# Patient Record
Sex: FEMALE | Race: AMERICAN INDIAN OR ALASKA NATIVE
[De-identification: names, ages, dates, MRNs, and addresses within clinical notes are randomized per-mention and may not be internally consistent; named-entity substitution may affect disease eponyms.]

---

## 2020-02-26 ENCOUNTER — HOSPITAL ENCOUNTER (OUTPATIENT)
Dept: HOSPITAL 5 - OR | Age: 65
Discharge: HOME | End: 2020-02-26
Attending: OBSTETRICS & GYNECOLOGY
Payer: COMMERCIAL

## 2020-02-26 VITALS — SYSTOLIC BLOOD PRESSURE: 157 MMHG | DIASTOLIC BLOOD PRESSURE: 77 MMHG

## 2020-02-26 DIAGNOSIS — Z98.890: ICD-10-CM

## 2020-02-26 DIAGNOSIS — N95.0: Primary | ICD-10-CM

## 2020-02-26 DIAGNOSIS — Z86.2: ICD-10-CM

## 2020-02-26 DIAGNOSIS — Z79.899: ICD-10-CM

## 2020-02-26 DIAGNOSIS — Z87.440: ICD-10-CM

## 2020-02-26 DIAGNOSIS — N85.8: ICD-10-CM

## 2020-02-26 LAB
HCT VFR BLD CALC: 34.2 % (ref 30.3–42.9)
HGB BLD-MCNC: 10.8 GM/DL (ref 10.1–14.3)
MCHC RBC AUTO-ENTMCNC: 32 % (ref 30–34)
MCV RBC AUTO: 71 FL (ref 79–97)
PLATELET # BLD: 225 K/MM3 (ref 140–440)
RBC # BLD AUTO: 4.81 M/MM3 (ref 3.65–5.03)

## 2020-02-26 PROCEDURE — 58558 HYSTEROSCOPY BIOPSY: CPT

## 2020-02-26 PROCEDURE — A4217 STERILE WATER/SALINE, 500 ML: HCPCS

## 2020-02-26 PROCEDURE — 85027 COMPLETE CBC AUTOMATED: CPT

## 2020-02-26 PROCEDURE — 36415 COLL VENOUS BLD VENIPUNCTURE: CPT

## 2020-02-26 PROCEDURE — 88305 TISSUE EXAM BY PATHOLOGIST: CPT

## 2020-02-26 NOTE — SHORT STAY SUMMARY
Short Stay Documentation


Date of service: 20


Narrative H&P: 


64-year-old  with a history of postmenopausal bleeding.  The patient 

underwent evaluation in the office where an endometrial biopsy was attempted 

however was unsuccessful secondary to a stenotic cervical loss.  The pelvic 

ultrasound demonstrated evidence of a mildly thickened endometrial stripe.








- History


Principal diagnosis: Postmenopausal bleeding


Past Medical History: other (glucose intolerance)


Past Surgical History: Other (tubal ligation)


Social history: 





- Allergies and Medications


Current Medications: 


                                    Allergies





No Known Allergies Allergy (Unverified 20 11:41)


   





                                Home Medications











 Medication  Instructions  Recorded  Confirmed  Last Taken  Type


 


Geritol Tonic 1 tbsp PO Q48HR 20 Unknown History








Active Medications





Fentanyl (Sublimaze)  50 mcg IV Q5MIN PRN


   PRN Reason: Pain , Severe (7-10)


Lactated Ringer's (Lactated Ringers)  1,000 mls @ 125 mls/hr IV AS DIRECT FRANK


Ondansetron HCl (Zofran)  4 mg IV ONCE PRN


   PRN Reason: Nausea And Vomiting











- Physical exam


General appearance: no acute distress


Integumentary: no rash


HEENT: Atraumatic


Lungs: Clear to auscultation


Breasts: deferred


Heart: Regular rate


Gastrointestinal: normal


Female Genitourinary: deferred


Rectal Exam: deferred


Extremities: no ischemia


Neurological: Normal gait





- Brief post op/procedure progress note


Date of procedure: 20


Pre-op diagnosis: Postmenopausal bleeding


Post-op diagnosis: same


Procedure: 





Hysteroscopy


Dilatation and curettage


Anesthesia: MAC


Surgeon: ZAIDA WARD


Estimated blood loss: minimal


Pathology: list (Endometrial curettings)


Specimen disposition: to lab


Condition: stable





- Hospital course


Hospital course: 





The patient was admitted the day of surgery underwent hysteroscopy dilatation 

and curettage.  Please see operative note for details of surgery.  Her 

postoperative course was uneventful.





- Disposition


Condition at discharge: Good


Disposition: DC-01 TO HOME OR SELFCARE





Short Stay Discharge Plan


Activity: other (Pelvic rest for 1 week)


Diet: regular


Additional Instructions: 


Schedule follow-up with Dr. Ward in 1 to 2 weeks


Prescriptions: 


Ibuprofen [Motrin] 800 mg PO Q8HR PRN #60 tablet


 PRN Reason: Pain, Mild (1-3)


HYDROcodone/APAP 5-325 [[place] 5/325] 1 each PO Q6HR PRN #20 tablet


 PRN Reason: Pain

## 2020-02-26 NOTE — ANESTHESIA CONSULTATION
Anesthesia Consult and Med Hx


Date of service: 02/26/20





- Airway


Anesthetic Teeth Evaluation: Partials (Upper)


ROM Head & Neck: Adequate


Mental/Hyoid Distance: Adequate


Mallampati Class: Class II


Intubation Access Assessment: Good





- Pre-Operative Health Status


ASA Pre-Surgery Classification: ASA2


Proposed Anesthetic Plan: General





- Pulmonary


Hx Sleep Apnea:  (SNORES)





- Cardiovascular System


Hx Hypertension: No (States she can climb two flights of stairs)





- Central Nervous System


Hx Psychiatric Problems: No





- Endocrine


Hx Hypothyroidism: No





- Hematic


Hx Anemia: Yes


Hx Sickle Cell Disease: No





- Other Systems


Hx Alcohol Use: No


Hx Substance Use: No


Hx Cancer: No

## 2020-02-26 NOTE — OPERATIVE REPORT
Operative Report


Operative Report: 


Date of procedure: 2020





Pre-operative diagnosis: Postmenopausal bleeding; stenotic cervical office





Post-operative diagnosis: Same as above





Procedure name(s): Hysteroscopy; dilatation and curettage





Surgeon: Elli Ashford M.D.





Assistant: None





Anesthesia: Laryngeal anesthesia





Findings mildly thickened endometrial lining with evidence of synechiae





Pathology: Endometrial curettings





Indication: 64-year-old  with a history of postmenopausal bleeding.  

Unable to obtain endometrial sampling in the office secondary to stenotic 

cervical loss.





Procedure


The patient was taken to the operating room and given general tracheal 

anesthesia without complication.  The patient was prepped and draped in a normal

sterile fashion.  A bivalve speculum was placed in the patient's vagina single-

tooth tenaculums placed on the anterior lip of the cervix.  The cervical os was 

dilated with graduated dilators.  A uterine sound was inserted.  The 

hysteroscope was then placed.  Insufflation of the uterine cavity was performed 

with normal saline.  Gen. survey of the uterine cavity revealed mildly thickened

endometrial lining with evidence of synechiae.  The hysteroscope was then 

removed.       A sharp curettage of the endometrial surface was performed.  The 

remainder of the vaginal instruments were then removed atraumatically.  The 

patient was then successfully extubated taken to the recovery room.  All sponge 

laps and needle counts were correct 2.

## 2021-11-17 ENCOUNTER — OFFICE VISIT (OUTPATIENT)
Dept: URBAN - METROPOLITAN AREA CLINIC 70 | Facility: CLINIC | Age: 66
End: 2021-11-17